# Patient Record
Sex: FEMALE | Race: WHITE
[De-identification: names, ages, dates, MRNs, and addresses within clinical notes are randomized per-mention and may not be internally consistent; named-entity substitution may affect disease eponyms.]

---

## 2021-10-14 ENCOUNTER — HOSPITAL ENCOUNTER (OUTPATIENT)
Dept: HOSPITAL 11 - JP.SDS | Age: 66
Discharge: HOME | End: 2021-10-14
Attending: OPHTHALMOLOGY
Payer: MEDICARE

## 2021-10-14 DIAGNOSIS — H25.11: Primary | ICD-10-CM

## 2021-10-14 RX ADMIN — Medication PRN ML: at 07:42

## 2021-10-14 NOTE — OR
DATE OF PROCEDURE:  10/14/2021

 

SURGEON:  Kim Zambrano MD

 

POSTOPERATIVE CARE:  Postoperative care will be provided mainly at the [address] [place] [address] Eye

[facility] in conjunction with [facility].

 

PREOPERATIVE DIAGNOSIS:  Cataract, right eye.

 

POSTOPERATIVE DIAGNOSIS:  Cataract, right eye.

 

PROCEDURE:  Phacoemulsification with intraocular lens placement, right eye.

 

ANESTHESIA:  Topical and intracameral.

 

ESTIMATED BLOOD LOSS:  Minimal.

 

COMPLICATIONS:  None.

 

PATHOLOGY SPECIMENS:  None.

 

SURGICAL FINDINGS:  None.

 

INDICATION FOR PROCEDURE:  The patient is a 65-year-old female with history of a visually

significant cataract in the right eye, which interfered with activities of daily living.

This consisted of a nuclear sclerosis cataract.  Following careful discussion of the risks,

benefits and alternatives to cataract extraction with intraocular lens placement including

blindness and death, the patient elected to proceed, and informed, written consent was

obtained prior to the procedure.

 

DESCRIPTION OF THE PROCEDURE:  The patient was previously identified, and a roe placed

above the right eye.  All sources, including the patient, indicated that the right eye was

the correct eye.  The patient was subsequently taken to the operating room where standard

monitors were applied.  The patient was then prepped and draped in the usual sterile fashion

for ophthalmic surgery.  Attention was first directed at the 12 o'clock position where a

paracentesis port was fashioned.  Shugar solution followed by Viscoat was instilled into the

eye.  Attention was then directed to the 8:30 position where a triplanar incision was made

in a near-clear manner using a keratome.  A continuous capsulorrhexis was then made using a

combination of the cystotome and Utrata forceps.  Hydrodissection was achieved using a

balanced salt solution, and the lens rotated nicely.  Phacoemulsification was then done

using a modified divide-and-conquer technique without complication.  Phaco time was 5.53

CDE. The remaining cortex was removed using the irrigation/aspiration handpiece.  Provisc

was then instilled into the eye.  A Technis lens, model DCB00, at 17.5 diopters was then

placed in the capsular bag using an Emerald injector.  The remaining viscoelastic was

removed using the irrigation/aspiration forceps.  All wounds were then checked and found to

be watertight.  The lid speculum and drapes were removed.  Maxitrol

ointment was placed in the patient's right eye, and the eye was shielded.  The patient

tolerated the procedure well.  The patient was instructed to follow up tomorrow.

 

All needle and sponge counts were correct at the end of the procedure.  There were no

surgical findings.

 

 

 

 

Kim Zambrano MD

DD:  10/14/2021 09:08:13

DT:  10/14/2021 12:46:34

Job #:  [id number]